# Patient Record
Sex: FEMALE | Race: WHITE | NOT HISPANIC OR LATINO | Employment: OTHER | URBAN - METROPOLITAN AREA
[De-identification: names, ages, dates, MRNs, and addresses within clinical notes are randomized per-mention and may not be internally consistent; named-entity substitution may affect disease eponyms.]

---

## 2018-08-15 ENCOUNTER — OFFICE VISIT (OUTPATIENT)
Dept: INTERNAL MEDICINE CLINIC | Facility: CLINIC | Age: 58
End: 2018-08-15

## 2018-08-15 VITALS
WEIGHT: 141.2 LBS | HEIGHT: 68 IN | SYSTOLIC BLOOD PRESSURE: 100 MMHG | DIASTOLIC BLOOD PRESSURE: 64 MMHG | BODY MASS INDEX: 21.4 KG/M2 | HEART RATE: 59 BPM | OXYGEN SATURATION: 97 %

## 2018-08-15 DIAGNOSIS — L65.9 HAIR LOSS: ICD-10-CM

## 2018-08-15 DIAGNOSIS — Z00.00 ENCOUNTER FOR WELLNESS EXAMINATION: Primary | ICD-10-CM

## 2018-08-15 DIAGNOSIS — Z13.220 ENCOUNTER FOR SCREENING FOR LIPID DISORDER: ICD-10-CM

## 2018-08-15 DIAGNOSIS — Z12.39 SCREENING FOR BREAST CANCER: ICD-10-CM

## 2018-08-15 PROCEDURE — 99386 PREV VISIT NEW AGE 40-64: CPT | Performed by: NURSE PRACTITIONER

## 2018-08-15 RX ORDER — DIPHENOXYLATE HYDROCHLORIDE AND ATROPINE SULFATE 2.5; .025 MG/1; MG/1
1 TABLET ORAL DAILY
COMMUNITY

## 2018-08-15 NOTE — PROGRESS NOTES
Assessment/Plan:     Diagnoses and all orders for this visit:    Encounter for wellness examination    Hair loss  -     Comprehensive metabolic panel; Future  -     CBC and differential; Future  -     Vitamin D 25 hydroxy; Future  -     TSH, 3rd generation; Future  -     T4, free; Future    Encounter for screening for lipid disorder  -     Lipid Panel with Direct LDL reflex; Future    Screening for breast cancer  -     Mammo screening bilateral w cad; Future    Other orders  -     multivitamin (THERAGRAN) TABS; Take 1 tablet by mouth daily  -     VITAMIN A PO; Take by mouth  -     Cholecalciferol (VITAMIN D PO); Take by mouth          Subjective:      Patient ID: Bibi Ayala is a 63 y o  female  Here to establish care  Doing well, eats a vegan diet, exercises regularly     Complaining of brittle hair  over the last couple years   LMP was 2 years ago  Thyroid issues run in her family     Colonoscopy- appt coming up in the next few months   mammo- 2 years ago, due now   Pap 2 years ago, due in 3 years per gyn            The following portions of the patient's history were reviewed and updated as appropriate: allergies, current medications, past family history, past medical history, past social history, past surgical history and problem list     Review of Systems   Constitutional: Negative  HENT: Negative  Eyes: Negative  Respiratory: Negative  Cardiovascular: Negative  Gastrointestinal: Negative  Endocrine: Negative  Genitourinary: Negative  Musculoskeletal: Negative  Skin: Negative  Neurological: Negative  Psychiatric/Behavioral: Negative  Objective:      /64   Pulse 59   Ht 5' 8" (1 727 m)   Wt 64 kg (141 lb 3 2 oz)   SpO2 97%   BMI 21 47 kg/m²          Physical Exam   Constitutional: She is oriented to person, place, and time  She appears well-developed and well-nourished     HENT:   Right Ear: External ear normal    Left Ear: External ear normal    Eyes: Pupils are equal, round, and reactive to light  Neck: No thyromegaly present  Cardiovascular: Normal rate, regular rhythm and normal heart sounds  Pulmonary/Chest: Effort normal and breath sounds normal    Abdominal: Soft  Bowel sounds are normal    Musculoskeletal: She exhibits no edema  Lymphadenopathy:     She has no cervical adenopathy  Neurological: She is alert and oriented to person, place, and time  Psychiatric: She has a normal mood and affect  Her behavior is normal    Vitals reviewed

## 2019-10-02 ENCOUNTER — TELEPHONE (OUTPATIENT)
Dept: INTERNAL MEDICINE CLINIC | Facility: CLINIC | Age: 59
End: 2019-10-02

## 2019-10-28 ENCOUNTER — OFFICE VISIT (OUTPATIENT)
Dept: INTERNAL MEDICINE CLINIC | Facility: CLINIC | Age: 59
End: 2019-10-28

## 2019-10-28 VITALS
RESPIRATION RATE: 18 BRPM | HEIGHT: 68 IN | BODY MASS INDEX: 20.07 KG/M2 | WEIGHT: 132.4 LBS | OXYGEN SATURATION: 97 % | DIASTOLIC BLOOD PRESSURE: 64 MMHG | TEMPERATURE: 97.8 F | HEART RATE: 65 BPM | SYSTOLIC BLOOD PRESSURE: 112 MMHG

## 2019-10-28 DIAGNOSIS — Z00.00 ANNUAL PHYSICAL EXAM: Primary | ICD-10-CM

## 2019-10-28 DIAGNOSIS — Z11.59 ENCOUNTER FOR HEPATITIS C SCREENING TEST FOR LOW RISK PATIENT: ICD-10-CM

## 2019-10-28 PROCEDURE — 99396 PREV VISIT EST AGE 40-64: CPT | Performed by: NURSE PRACTITIONER

## 2019-10-28 RX ORDER — GLUCOSAMINE/D3/BOSWELLIA SERRA 1500MG-400
TABLET ORAL
COMMUNITY
End: 2020-11-02

## 2019-10-28 NOTE — PROGRESS NOTES
ADULT ANNUAL PHYSICAL  [id number] INTERNAL MEDICINE    NAME: Army Salter  AGE: 63 y o  SEX: female  : 1960     DATE: 10/28/2019     Assessment and Plan:     Problem List Items Addressed This Visit     None      Visit Diagnoses     Annual physical exam    -  Primary    Relevant Orders    Comprehensive metabolic panel    Lipid Panel with Direct LDL reflex    Encounter for hepatitis C screening test for low risk patient        Relevant Orders    Hepatitis C antibody          Immunizations and preventive care screenings were discussed with patient today  Appropriate education was printed on patient's after visit summary  Counseling:  Alcohol/drug use: discussed moderation in alcohol intake, the recommendations for healthy alcohol use, and avoidance of illicit drug use  Dental Health: discussed importance of regular tooth brushing, flossing, and dental visits  Injury prevention: discussed safety/seat belts, safety helmets, smoke detectors, carbon dioxide detectors, and smoking near bedding or upholstery  Sexual health: discussed sexually transmitted diseases, partner selection, use of condoms, avoidance of unintended pregnancy, and contraceptive alternatives  · Exercise: the importance of regular exercise/physical activity was discussed  Recommend exercise 3-5 times per week for at least 30 minutes  Return in about 1 year (around 10/28/2020) for Annual physical      Chief Complaint:     Chief Complaint   Patient presents with    Annual Exam    Blood work request     STD check      History of Present Illness:     Adult Annual Physical   Patient here for a comprehensive physical exam  The patient reports no problems  Diet and Physical Activity  · Diet/Nutrition: well balanced diet and plant based, avoids gluten and dairy   · Exercise: 5-7 times a week on average        Depression Screening  PHQ-9 Depression Screening    PHQ-9:    Frequency of the following problems over the past two weeks:       Little interest or pleasure in doing things:  0 - not at all  Feeling down, depressed, or hopeless:  0 - not at all  PHQ-2 Score:  0       General Health  · Sleep: sleeps well  · Hearing: normal - none   · Vision: goes for regular eye exams  · Dental: regular dental visits  /GYN Health  · Patient is: postmenopausal  · Last menstrual period: 4 years ago     Review of Systems:     Review of Systems   Constitutional: Negative for activity change, appetite change, fatigue and unexpected weight change  Eyes: Negative for visual disturbance  Respiratory: Negative for cough, chest tightness, shortness of breath and wheezing  Cardiovascular: Negative for chest pain, palpitations and leg swelling  Gastrointestinal: Negative for abdominal pain, blood in stool, constipation and diarrhea  Genitourinary: Negative for difficulty urinating  Musculoskeletal: Negative for arthralgias  Skin: Negative for rash  Neurological: Negative for dizziness, weakness, light-headedness and headaches  Psychiatric/Behavioral: Negative for sleep disturbance  The patient is not nervous/anxious         Past Medical History:     Past Medical History:   Diagnosis Date    [facility]     No known health problems       Past Surgical History:     Past Surgical History:   Procedure Laterality Date    CERVIX SURGERY      NO PAST SURGERIES        Social History:     Social History     Socioeconomic History    Marital status: /Civil Union     Spouse name: None    Number of children: None    Years of education: None    Highest education level: None   Occupational History    None   Social Needs    Financial resource strain: None    Food insecurity:     Worry: None     Inability: None    Transportation needs:     Medical: None     Non-medical: None   Tobacco Use    Smoking status: Never Smoker    Smokeless tobacco: Never Used   Substance and Sexual Activity    Alcohol use: No     Comment: ocassional    Drug use: No    Sexual activity: None   Lifestyle    Physical activity:     Days per week: None     Minutes per session: None    Stress: None   Relationships    Social connections:     Talks on phone: None     Gets together: None     Attends Taoist service: None     Active member of club or organization: None     Attends meetings of clubs or organizations: None     Relationship status: None    Intimate partner violence:     Fear of current or ex partner: None     Emotionally abused: None     Physically abused: None     Forced sexual activity: None   Other Topics Concern    None   Social History Narrative    None      Family History:     Family History   Problem Relation Age of Onset    Alcohol abuse Neg Hx     Substance Abuse Neg Hx     Mental illness Neg Hx     Depression Neg Hx       Current Medications:     Current Outpatient Medications   Medication Sig Dispense Refill    Biotin 03515 MCG TABS Take by mouth      COLLAGEN PO Take by mouth      multivitamin (THERAGRAN) TABS Take 1 tablet by mouth daily      Cholecalciferol (VITAMIN D PO) Take by mouth      VITAMIN A PO Take by mouth       No current facility-administered medications for this visit  Allergies: Allergies   Allergen Reactions    Anesthetic [Benzocaine] Vomiting      Physical Exam:     /64   Pulse 65   Temp 97 8 °F (36 6 °C) (Oral)   Resp 18   Ht 5' 8" (1 727 m)   Wt 60 1 kg (132 lb 6 4 oz)   SpO2 97%   BMI 20 13 kg/m²     Physical Exam   Constitutional: She is oriented to person, place, and time  She appears well-developed and well-nourished  HENT:   Head: Normocephalic and atraumatic  Mouth/Throat: Oropharynx is clear and moist    Eyes: Pupils are equal, round, and reactive to light  Conjunctivae are normal    Neck: No thyromegaly present  Cardiovascular: Normal rate, regular rhythm, normal heart sounds and intact distal pulses  Pulmonary/Chest: Effort normal and breath sounds normal    Abdominal: Soft  Bowel sounds are normal    Musculoskeletal: Normal range of motion  She exhibits no edema  Lymphadenopathy:     She has no cervical adenopathy  Neurological: She is alert and oriented to person, place, and time  Skin: Skin is warm and dry  Psychiatric: She has a normal mood and affect  Her behavior is normal    Vitals reviewed        [address] INTERNAL MEDICINE

## 2019-10-28 NOTE — PATIENT INSTRUCTIONS

## 2019-11-02 LAB — HCV AB SER-ACNC: NEGATIVE

## 2019-11-11 ENCOUNTER — TELEPHONE (OUTPATIENT)
Dept: INTERNAL MEDICINE CLINIC | Facility: CLINIC | Age: 59
End: 2019-11-11

## 2019-11-11 NOTE — TELEPHONE ENCOUNTER
Her bilirubin always runs high, most commonly from a benign very common condition called gilberts syndrome  We monitor it every year in normal blood work  All of her other liver enzymes are normal  This is not a concern  Her vit d is normal  If she takes a multivitamin there should be vit d 3 400-800 units  It is not necessary for her to take one but will not hurt her to take 400-800 units daily

## 2019-11-11 NOTE — TELEPHONE ENCOUNTER
Pt  Said she saw that her bilirubin number was high and she was not advised of this  Also, she wants to know about her Vit  D level-looks a little low  Should she be taking supplement? Her hair is falling out

## 2020-01-06 ENCOUNTER — TELEPHONE (OUTPATIENT)
Dept: DERMATOLOGY | Facility: CLINIC | Age: 60
End: 2020-01-06

## 2020-01-13 ENCOUNTER — TELEPHONE (OUTPATIENT)
Dept: DERMATOLOGY | Facility: CLINIC | Age: 60
End: 2020-01-13

## 2020-02-10 ENCOUNTER — OFFICE VISIT (OUTPATIENT)
Dept: DERMATOLOGY | Facility: CLINIC | Age: 60
End: 2020-02-10

## 2020-02-10 VITALS — BODY MASS INDEX: 20.31 KG/M2 | TEMPERATURE: 97.9 F | WEIGHT: 134 LBS | HEIGHT: 68 IN

## 2020-02-10 DIAGNOSIS — D22.9 MULTIPLE BENIGN MELANOCYTIC NEVI: ICD-10-CM

## 2020-02-10 DIAGNOSIS — L82.1 SEBORRHEIC KERATOSIS: ICD-10-CM

## 2020-02-10 DIAGNOSIS — D22.39 FIBROUS PAPULE OF NOSE: ICD-10-CM

## 2020-02-10 DIAGNOSIS — I78.1 SPIDER TELANGIECTASIS: Primary | ICD-10-CM

## 2020-02-10 DIAGNOSIS — D18.01 CHERRY ANGIOMA: ICD-10-CM

## 2020-02-10 DIAGNOSIS — L90.5 SCAR: ICD-10-CM

## 2020-02-10 PROCEDURE — 17110 DESTRUCTION B9 LES UP TO 14: CPT | Performed by: DERMATOLOGY

## 2020-02-10 PROCEDURE — 99244 OFF/OP CNSLTJ NEW/EST MOD 40: CPT | Performed by: DERMATOLOGY

## 2020-02-10 NOTE — PROGRESS NOTES
[address] [facility] Note     Patient Name: Shanika Maldonado  Encounter Date: 2/10/20    Today's Chief Concerns:  Michelle Edouard Concern #1:  Lesion on face     Past Medical History:  Have you ever had or currently have any of the following medical conditions or treatments? · HIV/AIDS: No  · Hepatitis B: No  · Hepatitis C: No   · Diabetes: No  · Tuberculosis: No  · Biologic Therapy/Chemotherapy: No  · Organ or Bone Marrow Transplantation: No  · Radiation Treatment: No  · Cancer (If Yes, which types)- No      Have you ever had any of the following skin conditions? · Melanoma? (If Yes, please provide more detail)- No  · Basal Cell Carcinoma: No  · Squamous Cell Carcinoma: No  · Sebaceous Cell Carcinoma: No  · Merkel Cell Carcinoma: No  · Angiosarcoma: No  · Blistering Sunburns: No  · Eczema: No  · Psoriasis: No    Social History:    What is your current Smoking Status? 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 Running     Family history:  Do any of your "first degree relatives" (parent, brother, sister, or child) have any of the following conditions? · Melanoma? (If Yes, which relatives?) No  · Eczema: No  · Asthma: No  · Hay Fever/Seasonal Allergies: No  · Psoriasis: No  · Arthritis: No  · Thyroid Problems: No  · Lupus/Connective Tissue Disease: No  · Diabetes: No  · Stroke: No  · Blood Clots: No  · IBD/Crohn's/Ulcerative Colitis: No  · Vitiligo: No  · Scarring/Keloids: No  · Severe Acne: No  · Pancreatic Cancer: No  · Other known Skin Condition? If Yes, what condition and which relatives?   No    Current Medications:    Current Outpatient Medications:     Biotin 98487 MCG TABS, Take by mouth, Disp: , Rfl:     Cholecalciferol (VITAMIN D PO), Take by mouth, Disp: , Rfl:     COLLAGEN PO, Take by mouth, Disp: , Rfl:     multivitamin (THERAGRAN) TABS, Take 1 tablet by mouth daily, Disp: , Rfl:     VITAMIN A PO, Take by mouth, Disp: , Rfl: Specific Alerts:    Have you been seen by a [facility] Dermatologist in the last 3 years? No    Are you pregnant or planning to become pregnant? No    Are you currently or planning to be nursing or breast feeding? No    Allergies   Allergen Reactions    Anesthetic [Benzocaine] Vomiting       May we call your Preferred Phone number to discuss your specific medical information? YES    May we leave a detailed message that includes your specific medical information? YES    Have you traveled outside of the [facility] in the past 3 months? No    Do you currently have a pacemaker or defibrillator? No    Do you have any artificial heart valves, joints, plates, screws, rods, stents, pins, etc? No   - If Yes, were any placed within the last 2 years? Do you require any medications prior to a surgical procedure? No   - If Yes, for which procedure? n/a   - If Yes, what medications to you require? n/a    Are you taking any medications that cause you to bleed more easily ("blood thinners") No    Have you ever experienced a rapid heartbeat with epinephrine? No    Have you ever been treated with "gold" (gold sodium thiomalate) therapy? No    [facility] can help with wrinkles, "laugh lines," facial volume loss, "double chin," "love handles," age spots, and more  Are you interested in learning today about some of the skin enhancement procedures that we offer? (If Yes, please provide more detail) No    Review of Systems:  Have you recently had or currently have any of the following?     · Fever or chills: No  · Night Sweats: No  · Headaches: No  · Weight Gain: No  · Weight Loss: No  · Blurry Vision: No  · Nausea: No  · Vomiting: No  · Diarrhea: No  · Blood in Stool: No  · Abdominal Pain: No  · Itchy Skin: No  · Painful Joints: No  · Swollen Joints: No  · Muscle Pain: No  · Irregular Mole: YES  · Sun Burn: No  · Dry Skin: No  · Skin Color Changes: No  · Scar or Keloid: YES  · Cold Sores/Fever Blisters: No  · Bacterial Infections/MRSA: No  · Anxiety: No  · Depression: No  · Suicidal or Homicidal Thoughts: No      PHYSICAL EXAM:      Was a chaperone (Derm Clinical Assistant) present for the entirety of the Physical Exam? YES    Did the Dermatology Team specifically ask and  the patient on the importance of a Full Skin Exam to be sure that nothing is missed clinically? YES    Did the patient request or accept a Full Skin Exam?  No    Did the patient specifically refuse to have the areas "under-the-bra" examined by the Dermatologist? No    Did the patient specifically refuse to have the areas "under-the-underwear" examined by the Dermatologist? No      CONSTITUTIONAL:   Vitals:    02/10/20 1316   Temp: 97 9 °F (36 6 °C)   TempSrc: Tympanic   Weight: 60 8 kg (134 lb)   Height: 5' 8" (1 727 m)       PSYCH: Normal mood and affect  EYES: Normal conjunctiva  ENT: Normal lips and oral mucosa  CARDIOVASCULAR: No edema  RESPIRATORY: Normal respirations  HEME/LYMPH/IMMUNO:  No regional lymphadenopathy except as noted below in ASSESSMENT AND PLAN BY DIAGNOSIS     FULL ORGAN SYSTEM SKIN EXAM (SKIN)  Hair, Scalp, Ears, Face Normal except as noted below in Assessment   Neck, Cervical Chain Nodes Normal except as noted below in Assessment   Right Arm/Hand/Fingers Normal except as noted below in Assessment   Left Arm/Hand/Fingers Normal except as noted below in Assessment   Chest/Breasts/Axillae Viewed areas Normal except as noted below in Assessment   Abdomen, Umbilicus Normal except as noted below in Assessment   Back/Spine Normal except as noted below in Assessment   Groin and Genitalia deferred/Buttocks Viewed areas Normal except as noted below in Assessment   Right Leg, Foot, Toes Normal except as noted below in Assessment   Left Leg, Foot, Toes Normal except as noted below in Assessment        ASSESSMENT AND PLAN BY DIAGNOSIS:    History of Present Condition:     Duration:  How long has this been an issue for you? o  Years    Location Affected:  Where on the body is this affecting you?    o  Forehead    Quality:  Is there any bleeding, pain, itch, burning/irritation, or redness associated with the skin lesion?    o  Denies    Severity:  Describe any bleeding, pain, itch, burning/irritation, or redness on a scale of 1 to 10 (with 10 being the worst)  o  Denies    Timing:  Does this condition seem to be there pretty constantly or do you notice it more at specific times throughout the day?    o  Constant   Context:  Have you ever noticed that this condition seems to be associated with specific activities you do?    o  Denies   Modifying Factors:    o Anything that seems to make the condition worse?    -  Denies   o What have you tried to do to make the condition better?    -  Facial enzyme scrub with vitamin c   Associated Signs and Symptoms:  Does this skin lesion seem to be associated with any of the following:  o  Denies     1  SPIDER TELANGIECTASIS ("SPIDER ANGIOMA")    Physical Exam:   Anatomic Location Affected:  Mid forehead    Morphological Description:  Blanchable red macule        Additional History of Present Condition:  Located on the forehead  Patient states been present for years  Assessment and Plan:  Based on a thorough discussion of this condition and the management approach to it (including a comprehensive discussion of the known risks, side effects and potential benefits of treatment), the patient (family) agrees to implement the following specific plan:   Discussed laser therapy, hyfrecation   She will think about it        2  SEBORRHEIC KERATOSIS    Physical Exam:   Anatomic Location Affected:  Right upper back   Morphological Description:  raised, smooth to warty textured, brown, discrete, "stuck-on" appearing plaque    Additional History of Present Condition:  Patient reports new bump on the skin  Irritating  Causes soreness and pain      Assessment and Plan:  Based on a thorough discussion of this condition and the management approach to it (including a comprehensive discussion of the known risks, side effects and potential benefits of treatment), the patient (family) agrees to implement the following specific plan:   Reassure benign   Monitor for change    LN2 today    Seborrheic Keratosis  A seborrheic keratosis is a harmless warty spot that appears during adult life as a common sign of skin aging  Seborrheic keratoses can arise on any area of skin, covered or uncovered, with the usual exception of the palms and soles  They do not arise from mucous membranes  Seborrheic keratoses can have highly variable appearance  Seborrheic keratoses are extremely common  It has been estimated that over 90% of adults over the age of 61 years have one or more of them  They occur in males and females of all races, typically beginning to erupt in the 35s or 45s  They are uncommon under the age of 21 years  The precise cause of seborrhoeic keratoses is not known  Seborrhoeic keratoses are considered degenerative in nature  As time goes by, seborrheic keratoses tend to become more numerous  Some people inherit a tendency to develop a very large number of them; some people may have hundreds of them  The name "seborrheic keratosis" is misleading, because these lesions are not limited to a seborrhoeic distribution (scalp, mid-face, chest, upper back), nor are they formed from sebaceous glands, nor are they associated with sebum -- which is greasy    Seborrheic keratosis may also be called "SK," "Seb K," "basal cell papilloma," "senile wart," or "barnacle "      Researchers have noted:   Eruptive seborrhoeic keratoses can follow sunburn or dermatitis   Skin friction may be the reason they appear in body folds   Viral cause (e g , human papillomavirus) seems unlikely   Stable and clonal mutations or activation of FRFR3, PIK3CA, ESTELLE, AKT1 and EGFR genes are found in seborrhoeic keratoses   Seborrhoeic keratosis can arise from solar lentigo   FRFR3 mutations also arise in solar lentigines  These mutations are associated with increased age and location on the head and neck, suggesting a role of ultraviolet radiation in these lesions   Seborrheic keratoses do not harbour tumour suppressor gene mutations   Epidermal growth factor receptor inhibitors, which are used to treat some cancers, often result in an increase in verrucal (warty) keratoses  There is no easy way to remove multiple lesions on a single occasion  Unless a specific lesion is "inflamed" and is causing pain or stinging/burning or is bleeding, most insurance companies do not authorize treatment  PROCEDURE:  DESTRUCTION OF BENIGN LESIONS  After a thorough discussion of treatment options and risk/benefits/alternatives (including but not limited to local pain, scarring, dyspigmentation, blistering, and possible superinfection), verbal and written consent were obtained and the aforementioned lesions were treated on with cryotherapy using liquid nitrogen x 2 cycle for 5-10 seconds   TOTAL NUMBER of 1 lesions were treated today on the ANATOMIC LOCATION: Right upper back   The patient tolerated the procedure well, and after-care instructions were provided  3  CHERRY ANGIOMAS    Physical Exam:   Anatomic Location Affected:  Trunk and extremities    Morphological Description:  Scattered cherry red, 1-4 mm papules  Assessment and Plan:  Based on a thorough discussion of this condition and the management approach to it (including a comprehensive discussion of the known risks, side effects and potential benefits of treatment), the patient (family) agrees to implement the following specific plan:   Reassure benign         4   MELANOCYTIC NEVI ("Moles")    Physical Exam:   Anatomic Location Affected:   Mostly on sun-exposed areas of the Trunk and extremities    Morphological Description:  Scattered, 1-4mm round to ovoid, symmetrical-appearing, even bordered, skin colored to dark brown macules/papules, mostly in sun-exposed areas     Assessment and Plan:  Based on a thorough discussion of this condition and the management approach to it (including a comprehensive discussion of the known risks, side effects and potential benefits of treatment), the patient (family) agrees to implement the following specific plan:   When outside we recommend using a wide brim hat, sunglasses, long sleeve and pants, sunscreen with SPF 52+ with reapplication every 2 hours, or SPF specific clothing    Monitor for changes  Benign, reassured        5  scar  Physical Exam:   Anatomic Location Affected:  Right buttock   Morphological Description:  Soft, compressible skin colored plaque    Additional History of Present Condition:  History of cyst excision on buttock  Now has loose skin    Assessment and Plan:  Based on a thorough discussion of this condition and the management approach to it (including a comprehensive discussion of the known risks, side effects and potential benefits of treatment), the patient (family) agrees to implement the following specific plan:   Scar from cyst excision   Discussed treatment which may or may not work, including intralesional kenalog or re-excision   However, with location, this scar will possibly form again   Decided to leave alone  6  FIBROUS PAPULE ("ANGIOFIBROMA")    Physical Exam:   Anatomic Location Affected:  Left nasal ala   Morphological Description:  Skin colored papule    Additional History of Present Condition:  Had it burned in the past  Recently shaved it flat with a razor      Assessment and Plan:  Based on a thorough discussion of this condition and the management approach to it (including a comprehensive discussion of the known risks, side effects and potential benefits of treatment), the patient (family) agrees to implement the following specific plan:   Discussed treatment options, including hyfrecation   She will think about it  Monitor for changes  Benign, reassured           Scribe Attestation    I,:   Ej Waldron MA am acting as a scribe while in the presence of the attending physician :        I,:   Nato Hardy MD personally performed the services described in this documentation    as scribed in my presence :

## 2020-02-10 NOTE — PROGRESS NOTES
[address] [facility] Note     Patient Name: Nichole Butcher  Encounter Date: 2/10/2020    Today's Chief Concerns:  Ángela Zhou Concern #1:  Lesion on face    Past Medical History:  Have you ever had or currently have any of the following medical conditions or treatments? · HIV/AIDS: No  · Hepatitis B: No  · Hepatitis C: No   · Diabetes: No  · Tuberculosis: No  · Biologic Therapy/Chemotherapy: No  · Organ or Bone Marrow Transplantation: No  · Radiation Treatment: No  · Cancer (If Yes, which types)- No      Have you ever had any of the following skin conditions? · Melanoma? (If Yes, please provide more detail)- No  · Basal Cell Carcinoma: No  · Squamous Cell Carcinoma: No  · Sebaceous Cell Carcinoma: No  · Merkel Cell Carcinoma: No  · Angiosarcoma: No  · Blistering Sunburns: No  · Eczema: No  · Psoriasis: No    Social History:    What is your current Smoking Status? 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 Running    Family history:  Do any of your "first degree relatives" (parent, brother, sister, or child) have any of the following conditions? · Melanoma? (If Yes, which relatives?) No  · Eczema: No  · Asthma: No  · Hay Fever/Seasonal Allergies: No  · Psoriasis: No  · Arthritis: No  · Thyroid Problems: No  · Lupus/Connective Tissue Disease: No  · Diabetes: No  · Stroke: No  · Blood Clots: No  · IBD/Crohn's/Ulcerative Colitis: No  · Vitiligo: No  · Scarring/Keloids: No  · Severe Acne: No  · Pancreatic Cancer: No  · Other known Skin Condition? If Yes, what condition and which relatives?   No    Current Medications:    Current Outpatient Medications:     Biotin 22271 MCG TABS, Take by mouth, Disp: , Rfl:     Cholecalciferol (VITAMIN D PO), Take by mouth, Disp: , Rfl:     COLLAGEN PO, Take by mouth, Disp: , Rfl:     multivitamin (THERAGRAN) TABS, Take 1 tablet by mouth daily, Disp: , Rfl:     VITAMIN A PO, Take by mouth, Disp: , Rfl:     Specific Alerts:    Have you been seen by a [facility] Dermatologist in the last 3 years? No    Are you pregnant or planning to become pregnant? No    Are you currently or planning to be nursing or breast feeding? No    Allergies   Allergen Reactions    Anesthetic [Benzocaine] Vomiting       May we call your Preferred Phone number to discuss your specific medical information? YES    May we leave a detailed message that includes your specific medical information? YES    Have you traveled outside of the [facility] in the past 3 months? No    Do you currently have a pacemaker or defibrillator? No    Do you have any artificial heart valves, joints, plates, screws, rods, stents, pins, etc? No   - If Yes, were any placed within the last 2 years? Do you require any medications prior to a surgical procedure? No   - If Yes, for which procedure? - If Yes, what medications to you require? Are you taking any medications that cause you to bleed more easily ("blood thinners") No    Have you ever experienced a rapid heartbeat with epinephrine? No    Have you ever been treated with "gold" (gold sodium thiomalate) therapy? No    [facility] can help with wrinkles, "laugh lines," facial volume loss, "double chin," "love handles," age spots, and more  Are you interested in learning today about some of the skin enhancement procedures that we offer? (If Yes, please provide more detail) No    Review of Systems:  Have you recently had or currently have any of the following?     · Fever or chills: No  · Night Sweats: No  · Headaches: No  · Weight Gain: No  · Weight Loss: No  · Blurry Vision: No  · Nausea: No  · Vomiting: No  · Diarrhea: No  · Blood in Stool: No  · Abdominal Pain: No  · Itchy Skin: No  · Painful Joints: No  · Swollen Joints: No  · Muscle Pain: No  · Irregular Mole: YES  · Sun Burn: No  · Dry Skin: No  · Skin Color Changes: No  · Scar or Keloid: YES  · Cold Sores/Fever Blisters: No  · Bacterial Infections/MRSA: No  · Anxiety: No  · Depression: No  · Suicidal or Homicidal Thoughts: No      PHYSICAL EXAM:      Was a chaperone (Derm Clinical Assistant) present for the entirety of the Physical Exam? YES    Did the Dermatology Team specifically ask and  the patient on the importance of a Full Skin Exam to be sure that nothing is missed clinically?  YES    Did the patient request or accept a Full Skin Exam?  No    Did the patient specifically refuse to have the areas "under-the-bra" examined by the Dermatologist? No    Did the patient specifically refuse to have the areas "under-the-underwear" examined by the Dermatologist? No      CONSTITUTIONAL:   Vitals:    02/10/20 1316   Temp: 97 9 °F (36 6 °C)   TempSrc: Tympanic   Weight: 60 8 kg (134 lb)   Height: 5' 8" (1 727 m)       PSYCH: Normal mood and affect  EYES: Normal conjunctiva  ENT: Normal lips and oral mucosa  CARDIOVASCULAR: No edema  RESPIRATORY: Normal respirations  HEME/LYMPH/IMMUNO:  No regional lymphadenopathy except as noted below in ASSESSMENT AND PLAN BY DIAGNOSIS    FULL ORGAN SYSTEM SKIN EXAM (SKIN)  Hair, Scalp, Ears, Face Normal except as noted below in Assessment   Neck, Cervical Chain Nodes Normal except as noted below in Assessment   Right Arm/Hand/Fingers Normal except as noted below in Assessment   Left Arm/Hand/Fingers Normal except as noted below in Assessment   Chest/Breasts/Axillae Viewed areas Normal except as noted below in Assessment   Abdomen, Umbilicus Normal except as noted below in Assessment   Back/Spine Normal except as noted below in Assessment   Groin/Genitalia/Buttocks Viewed areas Normal except as noted below in Assessment   Right Leg, Foot, Toes Normal except as noted below in Assessment   Left Leg, Foot, Toes Normal except as noted below in Assessment        ASSESSMENT AND PLAN BY DIAGNOSIS:    History of Present Condition:     Duration:  How long has this been an issue for you?    o  2 or more years   Location Affected:  Where on the body is this affecting you?    o  Fore head   Quality:  Is there any bleeding, pain, itch, burning/irritation, or redness associated with the skin lesion?    o  Denies   Severity:  Describe any bleeding, pain, itch, burning/irritation, or redness on a scale of 1 to 10 (with 10 being the worst)    o  0   Timing:  Does this condition seem to be there pretty constantly or do you notice it more at specific times throughout the day?    o  Constant   Context:  Have you ever noticed that this condition seems to be associated with specific activities you do?    o  Denies   Modifying Factors:    o Anything that seems to make the condition worse?    -  Denies  o What have you tried to do to make the condition better?    -  Facial enzyme scrub with vitamin C   Associated Signs and Symptoms:  Does this skin lesion seem to be associated with any of the following:  o  Denies               TYPE HERE

## 2020-06-23 DIAGNOSIS — R19.03 ABDOMINAL MASS, RLQ (RIGHT LOWER QUADRANT): Primary | ICD-10-CM

## 2020-08-10 ENCOUNTER — ANNUAL EXAM (OUTPATIENT)
Dept: OBGYN CLINIC | Facility: CLINIC | Age: 60
End: 2020-08-10

## 2020-08-10 VITALS
HEIGHT: 68 IN | BODY MASS INDEX: 20.4 KG/M2 | DIASTOLIC BLOOD PRESSURE: 76 MMHG | SYSTOLIC BLOOD PRESSURE: 120 MMHG | WEIGHT: 134.6 LBS

## 2020-08-10 DIAGNOSIS — Z01.419 ENCNTR FOR GYN EXAM (GENERAL) (ROUTINE) W/O ABN FINDINGS: Primary | ICD-10-CM

## 2020-08-10 DIAGNOSIS — Z11.51 SCREENING FOR HUMAN PAPILLOMAVIRUS: ICD-10-CM

## 2020-08-10 DIAGNOSIS — Z12.39 SCREENING FOR BREAST CANCER: ICD-10-CM

## 2020-08-10 DIAGNOSIS — Z92.89: Primary | ICD-10-CM

## 2020-08-10 PROCEDURE — 3008F BODY MASS INDEX DOCD: CPT | Performed by: OBSTETRICS & GYNECOLOGY

## 2020-08-10 PROCEDURE — 87624 HPV HI-RISK TYP POOLED RSLT: CPT | Performed by: OBSTETRICS & GYNECOLOGY

## 2020-08-10 PROCEDURE — 99386 PREV VISIT NEW AGE 40-64: CPT | Performed by: OBSTETRICS & GYNECOLOGY

## 2020-08-10 PROCEDURE — G0145 SCR C/V CYTO,THINLAYER,RESCR: HCPCS | Performed by: OBSTETRICS & GYNECOLOGY

## 2020-08-10 NOTE — PROGRESS NOTES
Assessment/Plan:  52-year-old female presents for annual visit  Normal gyn exam  Pap and HPV testing performed today  Patient is  for 32 years in a long time in a long time monogamous relationship  She has no domestic issues and feels safe at home  Recommend screening mammogram, order generated  Patient reports colonoscopy is up-to-date  Diet and exercise discussed with patient  Patient states she has a full gym at home and she works out daily  Follow-up in 1 year or as needed     Diagnoses and all orders for this visit:    Encntr for gyn exam (general) (routine) w/o abn findings    Screening for breast cancer  -     Mammo screening bilateral w 3d & cad; Future          Subjective:      Patient ID: Marian Conway is a 62 y o  female  Patient is a 52-year-old  3 para 2 female who is postmenopausal, she presents for annual visit without gyn complaints      The following portions of the patient's history were reviewed and updated as appropriate: allergies, current medications, past family history, past medical history, past social history, past surgical history and problem list     Review of Systems   Constitutional: Negative for appetite change, fatigue, fever and unexpected weight change  Respiratory: Negative for cough and wheezing  Cardiovascular: Negative for leg swelling  Gastrointestinal: Negative for abdominal distention, abdominal pain, anal bleeding, blood in stool, constipation and diarrhea  Genitourinary: Negative for difficulty urinating, dyspareunia, dysuria, frequency, genital sores, menstrual problem, pelvic pain, urgency, vaginal bleeding, vaginal discharge and vaginal pain  Musculoskeletal: Negative for arthralgias and myalgias  Neurological: Negative for headaches  Psychiatric/Behavioral: Negative for sleep disturbance  The patient is not nervous/anxious            Objective:      /76 (BP Location: Left arm, Patient Position: Sitting, Cuff Size: Standard)   Ht 5' 8" (1 727 m)   Wt 61 1 kg (134 lb 9 6 oz)   BMI 20 47 kg/m²          Physical Exam  Vitals signs and nursing note reviewed  Exam conducted with a chaperone present  Constitutional:       Appearance: Normal appearance  She is well-developed and normal weight  HENT:      Head: Normocephalic and atraumatic  Neck:      Musculoskeletal: Neck supple  Thyroid: No thyromegaly  Cardiovascular:      Rate and Rhythm: Normal rate and regular rhythm  Heart sounds: Normal heart sounds  No murmur  Pulmonary:      Effort: Pulmonary effort is normal       Breath sounds: Normal breath sounds  Chest:      Breasts: Breasts are symmetrical          Right: No inverted nipple, mass, nipple discharge, skin change or tenderness  Left: No inverted nipple, mass, nipple discharge, skin change or tenderness  Abdominal:      General: Bowel sounds are normal  There is no distension  Palpations: Abdomen is soft  Tenderness: There is no abdominal tenderness  Hernia: There is no hernia in the left inguinal area  Genitourinary:     General: Normal vulva  Labia:         Right: No rash or tenderness  Left: No rash or tenderness  Vagina: Normal  No vaginal discharge  Cervix: No cervical motion tenderness, discharge or friability  Adnexa:         Right: No mass, tenderness or fullness  Left: No mass  Lymphadenopathy:      Lower Body: No right inguinal adenopathy  No left inguinal adenopathy  Skin:     General: Skin is warm and dry  Neurological:      Mental Status: She is alert and oriented to person, place, and time  Psychiatric:         Mood and Affect: Mood normal          Behavior: Behavior normal          Thought Content:  Thought content normal          Judgment: Judgment normal

## 2020-08-11 ENCOUNTER — TELEPHONE (OUTPATIENT)
Dept: OBGYN CLINIC | Facility: CLINIC | Age: 60
End: 2020-08-11

## 2020-08-11 NOTE — TELEPHONE ENCOUNTER
Patient called,  For her results from her Ultrasound she had done at [facility] MRI/radiology, called and they will fax the results,please review the results on your desk, and the patient would like a call back because she has questions  She would like to speak personally with you about

## 2020-08-12 ENCOUNTER — TELEPHONE (OUTPATIENT)
Dept: OBGYN CLINIC | Facility: CLINIC | Age: 60
End: 2020-08-12

## 2020-08-12 NOTE — TELEPHONE ENCOUNTER
patient called , someone had left a vm on her phone, spoke with Dr Emi Dennis , he left a detailed message on her vm about her results, she states she will listen to it and a virtual betsey-t made for tomorrow at 8:45 am  To discuss as per patient request

## 2020-08-12 NOTE — TELEPHONE ENCOUNTER
patient called and left a vm to rtc , about her test results, Dr Maulik Allred has not reviewed them yet ,

## 2020-08-12 NOTE — TELEPHONE ENCOUNTER
Spoke with patient she will listen to Dr Jesus Patterson message and appt for a virtual  appt made for tomorrow 8/13/2020 to discuss questions she may have

## 2020-08-12 NOTE — TELEPHONE ENCOUNTER
I reviewed her ultrasound her fibroids you to be stable  She has a stable right adnexal mass which is probable exophytic fibroid  I left a message on her cell phone with these results

## 2020-08-12 NOTE — TELEPHONE ENCOUNTER
Ask Dr Erika Lambert to review and please call patient  Tell him that patient is waiting for a response  Thank you

## 2020-08-12 NOTE — TELEPHONE ENCOUNTER
Spoke with Dr Jesus Gan, He did call the patient and left a lengthy voice message, patient did call back and we set up a virtual visit for 8/13/2020 so she can discuss further questions

## 2020-08-13 ENCOUNTER — TELEPHONE (OUTPATIENT)
Dept: OBGYN CLINIC | Facility: CLINIC | Age: 60
End: 2020-08-13

## 2020-08-13 LAB
LAB AP GYN PRIMARY INTERPRETATION: NORMAL
Lab: NORMAL

## 2020-08-13 NOTE — TELEPHONE ENCOUNTER
Patient called and left a vm that she got disconnected while on hold with you ,she would like the measurements of the fibroid in her uterus as well as she would like you to send her the report

## 2020-08-14 ENCOUNTER — TELEPHONE (OUTPATIENT)
Dept: OBGYN CLINIC | Facility: CLINIC | Age: 60
End: 2020-08-14

## 2020-08-14 NOTE — TELEPHONE ENCOUNTER
Spoke with patient and reviewed ultrasound  No change in her fibroid  She is aware  She off a follow-up for annual visit

## 2020-08-21 LAB
HPV HR 12 DNA CVX QL NAA+PROBE: NEGATIVE
HPV16 DNA CVX QL NAA+PROBE: NEGATIVE
HPV18 DNA CVX QL NAA+PROBE: NEGATIVE

## 2020-10-15 ENCOUNTER — TELEPHONE (OUTPATIENT)
Dept: INTERNAL MEDICINE CLINIC | Facility: CLINIC | Age: 60
End: 2020-10-15

## 2020-11-02 ENCOUNTER — OFFICE VISIT (OUTPATIENT)
Dept: INTERNAL MEDICINE CLINIC | Facility: CLINIC | Age: 60
End: 2020-11-02

## 2020-11-02 VITALS
OXYGEN SATURATION: 98 % | BODY MASS INDEX: 20.46 KG/M2 | HEIGHT: 68 IN | TEMPERATURE: 96.5 F | DIASTOLIC BLOOD PRESSURE: 68 MMHG | WEIGHT: 135 LBS | SYSTOLIC BLOOD PRESSURE: 110 MMHG | HEART RATE: 71 BPM

## 2020-11-02 DIAGNOSIS — Z00.00 ANNUAL PHYSICAL EXAM: Primary | ICD-10-CM

## 2020-11-02 DIAGNOSIS — Z78.0 POST-MENOPAUSAL: ICD-10-CM

## 2020-11-02 DIAGNOSIS — Z13.21 ENCOUNTER FOR VITAMIN DEFICIENCY SCREENING: ICD-10-CM

## 2020-11-02 DIAGNOSIS — Z13.0 SCREENING FOR DEFICIENCY ANEMIA: ICD-10-CM

## 2020-11-02 DIAGNOSIS — Z13.29 SCREENING FOR THYROID DISORDER: ICD-10-CM

## 2020-11-02 PROCEDURE — 99396 PREV VISIT EST AGE 40-64: CPT | Performed by: NURSE PRACTITIONER

## 2020-11-06 ENCOUNTER — TELEPHONE (OUTPATIENT)
Dept: INTERNAL MEDICINE CLINIC | Facility: CLINIC | Age: 60
End: 2020-11-06

## 2020-11-09 DIAGNOSIS — R79.89 ABNORMAL CBC: Primary | ICD-10-CM

## 2020-11-11 ENCOUNTER — TELEPHONE (OUTPATIENT)
Dept: INTERNAL MEDICINE CLINIC | Facility: CLINIC | Age: 60
End: 2020-11-11

## 2020-12-10 ENCOUNTER — TELEPHONE (OUTPATIENT)
Dept: INTERNAL MEDICINE CLINIC | Facility: CLINIC | Age: 60
End: 2020-12-10

## 2021-12-13 ENCOUNTER — ANNUAL EXAM (OUTPATIENT)
Dept: OBGYN CLINIC | Facility: CLINIC | Age: 61
End: 2021-12-13

## 2021-12-13 VITALS
DIASTOLIC BLOOD PRESSURE: 72 MMHG | SYSTOLIC BLOOD PRESSURE: 116 MMHG | WEIGHT: 131.8 LBS | BODY MASS INDEX: 19.97 KG/M2 | HEIGHT: 68 IN

## 2021-12-13 DIAGNOSIS — Z01.411 ENCOUNTER FOR WELL WOMAN EXAM WITH ABNORMAL FINDINGS: Primary | ICD-10-CM

## 2021-12-13 DIAGNOSIS — N63.25 BREAST LUMP ON LEFT SIDE AT 6 O'CLOCK POSITION: ICD-10-CM

## 2021-12-13 DIAGNOSIS — Z80.3 FAMILY HISTORY OF BREAST CANCER: ICD-10-CM

## 2021-12-13 PROCEDURE — S0612 ANNUAL GYNECOLOGICAL EXAMINA: HCPCS | Performed by: OBSTETRICS & GYNECOLOGY

## 2021-12-13 RX ORDER — MAGNESIUM 30 MG
30 TABLET ORAL 2 TIMES DAILY
COMMUNITY

## 2021-12-15 ENCOUNTER — TELEPHONE (OUTPATIENT)
Dept: GENETICS | Facility: CLINIC | Age: 61
End: 2021-12-15

## 2021-12-22 ENCOUNTER — HOSPITAL ENCOUNTER (OUTPATIENT)
Dept: ULTRASOUND IMAGING | Facility: CLINIC | Age: 61
Discharge: HOME | End: 2021-12-22

## 2021-12-22 ENCOUNTER — HOSPITAL ENCOUNTER (OUTPATIENT)
Dept: MAMMOGRAPHY | Facility: CLINIC | Age: 61
Discharge: HOME | End: 2021-12-22

## 2021-12-22 VITALS — HEIGHT: 68 IN | BODY MASS INDEX: 19.85 KG/M2 | WEIGHT: 131 LBS

## 2021-12-22 DIAGNOSIS — N63.25 BREAST LUMP ON LEFT SIDE AT 6 O'CLOCK POSITION: ICD-10-CM

## 2021-12-22 PROCEDURE — 76642 ULTRASOUND BREAST LIMITED: CPT

## 2021-12-22 PROCEDURE — 77066 DX MAMMO INCL CAD BI: CPT

## 2021-12-22 PROCEDURE — G0279 TOMOSYNTHESIS, MAMMO: HCPCS

## 2022-01-11 ENCOUNTER — TELEPHONE (OUTPATIENT)
Dept: OBGYN CLINIC | Facility: CLINIC | Age: 62
End: 2022-01-11

## 2022-01-11 NOTE — TELEPHONE ENCOUNTER
----- Message from Carlie Sanchez MD sent at 1/6/2022  4:21 PM EST -----  Dense breast noted, results discussed with patient at the time of the imaging patient desire to repeat ultrasound in 6 months then doing breast biopsy I also recommend patient to be seen by breast specialist for 2nd opinion for now recommendation to repeat ultrasound in 6 months of the left breast and routine screening mammogram in 1 y for both breast

## 2022-06-20 ENCOUNTER — HOSPITAL ENCOUNTER (OUTPATIENT)
Dept: ULTRASOUND IMAGING | Facility: CLINIC | Age: 62
Discharge: HOME | End: 2022-06-20

## 2022-06-20 VITALS — BODY MASS INDEX: 19.85 KG/M2 | HEIGHT: 68 IN | WEIGHT: 130.95 LBS

## 2022-06-20 DIAGNOSIS — Z09 FOLLOW-UP EXAM, 3-6 MONTHS SINCE PREVIOUS EXAM: ICD-10-CM

## 2022-06-20 PROCEDURE — 76642 ULTRASOUND BREAST LIMITED: CPT

## 2022-07-13 ENCOUNTER — HOSPITAL ENCOUNTER (EMERGENCY)
Facility: HOSPITAL | Age: 62
Discharge: HOME | End: 2022-07-13
Attending: EMERGENCY MEDICINE | Admitting: EMERGENCY MEDICINE
Payer: COMMERCIAL

## 2022-07-13 ENCOUNTER — APPOINTMENT (EMERGENCY)
Dept: RADIOLOGY | Facility: HOSPITAL | Age: 62
End: 2022-07-13
Payer: COMMERCIAL

## 2022-07-13 VITALS
WEIGHT: 130.95 LBS | BODY MASS INDEX: 19.91 KG/M2 | DIASTOLIC BLOOD PRESSURE: 56 MMHG | RESPIRATION RATE: 16 BRPM | HEART RATE: 58 BPM | SYSTOLIC BLOOD PRESSURE: 115 MMHG | TEMPERATURE: 98 F | OXYGEN SATURATION: 99 %

## 2022-07-13 DIAGNOSIS — S49.91XA ACROMIOCLAVICULAR JOINT INJURY, RIGHT, INITIAL ENCOUNTER: Primary | ICD-10-CM

## 2022-07-13 PROCEDURE — 99283 EMERGENCY DEPT VISIT LOW MDM: CPT

## 2022-07-13 PROCEDURE — 99282 EMERGENCY DEPT VISIT SF MDM: CPT | Performed by: EMERGENCY MEDICINE

## 2022-07-13 PROCEDURE — 73030 X-RAY EXAM OF SHOULDER: CPT

## 2022-07-13 NOTE — DISCHARGE INSTRUCTIONS
Call and schedule a follow-up appointment with the orthopedic doctor  I would refrain from heavy lifting until you follow-up with their team   Return to the emergency department should you have worsening pain, decreased range of motion, fevers or chills, or any other concerning symptoms

## 2022-07-13 NOTE — ED PROVIDER NOTES
History  Chief Complaint   Patient presents with    Shoulder Injury     Pt states that she has been hearing a pop in her shoulder  Abelino Posadas is a 65year old female presenting for evaluation right shoulder pain/injury  Three days ago, the patient was reaching across her body with her right arm to grab a glass of water when she felt a pop her shoulder  Patient describes having soreness after this event and she noticed that her right [facility] [place] joint appeared more prominent in comparison to the left  Patient denies any decrease in her range of motion or tenderness when ranging her shoulder  She denied any decreased strength or any numbness or tingling  She endorses tenderness to palpation of the [facility] [place] joint  She has been taking Tylenol and ibuprofen for her symptoms with good relief  The patient is concerned about possible AC joint dysfunction and she originally expressed hope that I would be able to put it back in place  History provided by:  Patient   used: No        Prior to Admission Medications   Prescriptions Last Dose Informant Patient Reported? Taking?    COLLAGEN PO   Yes No   Sig: Take by mouth   Cholecalciferol (VITAMIN D PO)   Yes No   Sig: Take by mouth   VITAMIN A PO   Yes No   Sig: Take by mouth   Zinc Sulfate (ZINC 15 PO)   Yes No   Sig: Take by mouth   magnesium 30 MG tablet   Yes No   Sig: Take 30 mg by mouth 2 (two) times a day   multivitamin (THERAGRAN) TABS   Yes No   Sig: Take 1 tablet by mouth daily      Facility-Administered Medications: None       Past Medical History:   Diagnosis Date    [facility]     No known health problems        Past Surgical History:   Procedure Laterality Date    CERVIX SURGERY      CONE SURGERY      SECTION      INDUCED       NO PAST SURGERIES      RHINOPLASTY      TUBAL LIGATION         Family History   Problem Relation Age of Onset    Atrial fibrillation Mother     Heart disease Father     No Known Problems Sister     No Known Problems Sister     No Known Problems Sister     No Known Problems Daughter     No Known Problems Son     No Known Problems Maternal Grandmother     No Known Problems Maternal Grandfather     No Known Problems Paternal Grandmother     No Known Problems Paternal Grandfather     No Known Problems Maternal Aunt     Alcohol abuse Neg Hx     Substance Abuse Neg Hx     Mental illness Neg Hx     Depression Neg Hx      I have reviewed and agree with the history as documented  E-Cigarette/Vaping    E-Cigarette Use Never User      E-Cigarette/Vaping Substances    Nicotine No     Flavoring No      Social History     Tobacco Use    Smoking status: Never Smoker    Smokeless tobacco: Never Used   Vaping Use    Vaping Use: Never used   Substance Use Topics    Alcohol use: Yes     Comment: ocassional    Drug use: No        Review of Systems   Constitutional: Negative for chills and fever  HENT: Negative for ear pain and sore throat  Eyes: Negative for pain and visual disturbance  Respiratory: Negative for cough and shortness of breath  Cardiovascular: Negative for chest pain and palpitations  Gastrointestinal: Negative for abdominal pain and vomiting  Genitourinary: Negative for dysuria and hematuria  Musculoskeletal: Positive for arthralgias  Negative for back pain  Right shoulder pain/injury   Skin: Negative for color change and rash  Neurological: Negative for seizures and syncope  All other systems reviewed and are negative        Physical Exam  ED Triage Vitals   Temperature Pulse Respirations Blood Pressure SpO2   07/13/22 0735 07/13/22 0707 07/13/22 0707 07/13/22 0707 07/13/22 0707   98 °F (36 7 °C) 58 16 115/56 99 %      Temp src Heart Rate Source Patient Position - Orthostatic VS BP Location FiO2 (%)   -- -- 07/13/22 0707 07/13/22 0707 --     Lying Right arm       Pain Score       07/13/22 0707       No Pain             Orthostatic Vital Signs  Vitals:    07/13/22 0707   BP: 115/56   Pulse: 58   Patient Position - Orthostatic VS: Lying       Physical Exam  Vitals and nursing note reviewed  Constitutional:       General: She is not in acute distress  Appearance: Normal appearance  HENT:      Head: Normocephalic and atraumatic  Mouth/Throat:      Mouth: Mucous membranes are moist       Pharynx: Oropharynx is clear  Eyes:      General: No scleral icterus  Conjunctiva/sclera: Conjunctivae normal    Cardiovascular:      Rate and Rhythm: Normal rate and regular rhythm  Heart sounds: Normal heart sounds  Pulmonary:      Effort: Pulmonary effort is normal  No respiratory distress  Breath sounds: Normal breath sounds  Abdominal:      General: Abdomen is flat  There is no distension  Tenderness: There is no abdominal tenderness  Musculoskeletal:         General: No tenderness or signs of injury  Right shoulder: Deformity and bony tenderness present  No swelling or effusion  Normal range of motion  Normal strength  Normal pulse  Left shoulder: Normal       Cervical back: Neck supple  No rigidity  Comments: Patient's right acromion/clavicular head does appear to be slightly more superiorly displaced in comparison to her left  Patient's right AC joint was tender to palpation  Patient did not have any decreased range of motion  She did not have any decreased strength  She was neurovascularly intact  Skin:     General: Skin is warm  Coloration: Skin is not jaundiced  Findings: No erythema or rash  Neurological:      General: No focal deficit present  Mental Status: She is alert  Mental status is at baseline     Psychiatric:         Mood and Affect: Mood normal          Behavior: Behavior normal          ED Medications  Medications - No data to display    Diagnostic Studies  Results Reviewed     None                 XR shoulder 2+ views RIGHT    (Results Pending) Procedures  Procedures      ED Course                                       MDM  Number of Diagnoses or Management Options  Acromioclavicular joint injury, right, initial encounter: new and requires workup     Amount and/or Complexity of Data Reviewed  Tests in the radiology section of CPT®: ordered and reviewed    Risk of Complications, Morbidity, and/or Mortality  General comments: Michelle oMsher is a 65year old female presenting for evaluation right shoulder pain/injury  On exam, the patient had full range of motion of her right shoulder, I did not appreciate any weakness, she was neurovascularly intact  Patient had tenderness to palpation of her right AC joint which did appear more prominent and superiorly displaced in comparison to the left  X-ray of the right shoulder did appear to show some [facility] of the [facility]  I advised the patient call and schedule a follow-up appointment with Orthopedics, I made a referral for her  I advised her to refrain from heavy lifting until she has this follow-up appointment  I gave her return precautions, she was agreeable to the plan  Patient Progress  Patient progress: stable      Disposition  Final diagnoses:   Acromioclavicular joint injury, right, initial encounter     Time reflects when diagnosis was documented in both MDM as applicable and the Disposition within this note     Time User Action Codes Description Comment    7/13/2022  8:05 AM Brendan Leigh Add [[id number]] Acromioclavicular joint injury, right, initial encounter       ED Disposition     ED Disposition   Discharge    Condition   Stable    Date/Time   Wed Jul 13, 2022  7:56 AM    Comment   Drake Dark discharge to home/self care                 Follow-up Information     Follow up With Specialties Details Why Contact Info Additional [address] [facility] [place] [facility] Schedule an appointment as soon as possible for a visit   [address] [place] [address] [address] [id number] [facility], [address] [facility] [address] [place], [place], [address] [phone]          Patient's Medications   Discharge Prescriptions    No medications on file         PDMP Review     None           ED Provider  Attending physically available and evaluated Dharmesh Yung I managed the patient along with the ED Attending      Electronically Signed by         Leonel Lo DO  07/13/22 0813

## 2022-07-24 NOTE — ED ATTENDING ATTESTATION
7/13/2022  IMarina MD, saw and evaluated the patient  I have discussed the patient with the resident/non-physician practitioner and agree with the resident's/non-physician practitioner's findings, Plan of Care, and MDM as documented in the resident's/non-physician practitioner's note, except where noted  All available labs and Radiology studies were reviewed  I was present for key portions of any procedure(s) performed by the resident/non-physician practitioner and I was immediately available to provide assistance  At this point I agree with the current assessment done in the Emergency Department    I have conducted an independent evaluation of this patient a history and physical is as follows:see h and p above -agree with resident tx plan/ dispo    ED Course  ED Course as of 07/24/22 1200   Wed Jul 13, 2022   0805 R shoulder xray --  no fx/dislocation/separation          Critical Care Time  Procedures

## 2023-01-27 ENCOUNTER — ANNUAL EXAM (OUTPATIENT)
Dept: OBGYN CLINIC | Facility: CLINIC | Age: 63
End: 2023-01-27

## 2023-01-27 VITALS
BODY MASS INDEX: 19.7 KG/M2 | SYSTOLIC BLOOD PRESSURE: 116 MMHG | HEIGHT: 68 IN | DIASTOLIC BLOOD PRESSURE: 78 MMHG | WEIGHT: 130 LBS

## 2023-01-27 DIAGNOSIS — Z78.0 MENOPAUSE: ICD-10-CM

## 2023-01-27 DIAGNOSIS — R92.8 ABNORMAL MAMMOGRAM: ICD-10-CM

## 2023-01-27 DIAGNOSIS — Z01.419 ENCOUNTER FOR GYNECOLOGICAL EXAMINATION WITHOUT ABNORMAL FINDING: Primary | ICD-10-CM

## 2023-01-27 NOTE — PROGRESS NOTES
Juan Ramon Rhoades is a 65 y o  female who presents for annual well woman exam    Denies any PMB  Mother breast cancer at age 80    Menstrual History:  OB History        3    Para   2    Term   2            AB        Living   2       SAB        IAB        Ectopic        Multiple        Live Births                      No LMP recorded  Patient is postmenopausal        The following portions of the patient's history were reviewed and updated as appropriate: allergies, current medications, past family history, past medical history, past social history, past surgical history and problem list     Review of Systems  Review of Systems   Constitutional: Negative for activity change, appetite change, chills, fatigue and fever  Respiratory: Negative for apnea, cough, chest tightness and shortness of breath  Cardiovascular: Negative for chest pain, palpitations and leg swelling  Gastrointestinal: Negative for abdominal pain, constipation, diarrhea, nausea and vomiting  Genitourinary: Negative for difficulty urinating, dysuria, flank pain, frequency, hematuria and urgency  Neurological: Negative for dizziness, seizures, syncope, light-headedness, numbness and headaches  Psychiatric/Behavioral: Negative for agitation and confusion            Objective      /78 (BP Location: Left arm, Patient Position: Sitting, Cuff Size: Adult)   Ht 5' 8" (1 727 m)   Wt 59 kg (130 lb)   BMI 19 77 kg/m²     Physical Exam  OBGyn Exam     General:   alert and oriented, in no acute distress, alert, appears stated age and cooperative   Heart: regular rate and rhythm, S1, S2 normal, no murmur, click, rub or gallop   Lungs: clear to auscultation bilaterally   Abdomen: soft, non-tender, without masses or organomegaly   Vulva: normal   Vagina: normal mucosa, normal discharge   Cervix: no cervical motion tenderness and no lesions   Uterus: normal size   Adnexa:  Breast Exam:  normal adnexa  breasts appear normal, no suspicious masses, no skin or nipple changes or axillary nodes, fibrocystic breast density noted on the left breast upper outer quadrant patient has cyst noted on ultrasound at follow-up mammogram and ultrasound scheduled  Assessment      @well woman@   25-year-old female  Annual exam   Post menopause   Breast lump left breast follow-up with ultrasound and mammogram  Family history of breast cancer and ovarian cancer declines genetic testing  Plan   Pap/hpv   Diet/exercise  Calcium/vitamin-D   Diagnostic mammogram and ultrasound  Colonoscopy up-to-date  DEXA scan  Return to office for annual exam     All questions answered  There are no Patient Instructions on file for this visit

## 2023-02-07 LAB
LAB AP GYN PRIMARY INTERPRETATION: NORMAL
Lab: NORMAL
PATH INTERP SPEC-IMP: NORMAL

## 2023-02-13 ENCOUNTER — HOSPITAL ENCOUNTER (OUTPATIENT)
Dept: ULTRASOUND IMAGING | Facility: CLINIC | Age: 63
Discharge: HOME | End: 2023-02-13

## 2023-02-13 ENCOUNTER — HOSPITAL ENCOUNTER (OUTPATIENT)
Dept: MAMMOGRAPHY | Facility: CLINIC | Age: 63
Discharge: HOME | End: 2023-02-13

## 2023-02-13 DIAGNOSIS — R92.8 ABNORMAL MAMMOGRAM: ICD-10-CM

## 2023-05-01 ENCOUNTER — TELEPHONE (OUTPATIENT)
Dept: OTHER | Facility: OTHER | Age: 63
End: 2023-05-01

## 2023-05-01 NOTE — TELEPHONE ENCOUNTER
Patient is requesting an appointment with Dr Fernanda Singer for intermittent rectal bleeding  Patient states the bleeding occurs once a month or once every other month  Patient recently had an episode on Saturday where she noticed blood when showering that took about 5 minutes to stop  Patient denies any bleeding today

## 2023-05-30 DIAGNOSIS — M85.80 OSTEOPENIA, UNSPECIFIED LOCATION: Primary | ICD-10-CM

## 2023-09-19 ENCOUNTER — HOSPITAL ENCOUNTER (OUTPATIENT)
Dept: ULTRASOUND IMAGING | Facility: CLINIC | Age: 63
Discharge: HOME | End: 2023-09-19
Payer: COMMERCIAL

## 2023-09-19 DIAGNOSIS — R92.8 ABNORMAL FINDINGS ON DIAGNOSTIC IMAGING OF BREAST: ICD-10-CM

## 2023-09-19 PROCEDURE — 76642 ULTRASOUND BREAST LIMITED: CPT

## 2023-09-25 DIAGNOSIS — R92.30 DENSE BREAST: Primary | ICD-10-CM

## 2023-09-25 DIAGNOSIS — R92.2 DENSE BREAST: Primary | ICD-10-CM

## 2023-09-25 DIAGNOSIS — Z80.3 FAMILY HISTORY OF BREAST CANCER: ICD-10-CM

## 2023-09-25 DIAGNOSIS — R92.8 ABNORMAL MAMMOGRAM: ICD-10-CM

## 2023-11-08 ENCOUNTER — TELEPHONE (OUTPATIENT)
Age: 63
End: 2023-11-08

## 2023-11-08 NOTE — TELEPHONE ENCOUNTER
Patients GI provider:  Dr. David Tavera    Number to return call: [phone]    Reason for call: Pt calling to schedule colonoscopy. Pt request the [address] [facility] location. Patient also request Clenpiq as her prep. Patient is a self pay.       Scheduled procedure/appointment date if applicable: N/A

## 2023-11-09 NOTE — TELEPHONE ENCOUNTER
Left message for pt to call back to schedule    5 yr colon recall, 9/10/18 BEC WNL, fam hx colon cancer?, BMI 19

## 2024-02-22 ENCOUNTER — TELEPHONE (OUTPATIENT)
Dept: OBGYN CLINIC | Facility: CLINIC | Age: 64
End: 2024-02-22

## 2024-02-22 NOTE — TELEPHONE ENCOUNTER
Patient returned Tenisha's call attempted to warm transfer call to office but call did not go through, patient hung up.

## 2024-02-22 NOTE — TELEPHONE ENCOUNTER
Called, unable to leave message for patient since no communication form is filled out.   Will follow up with patient again.

## 2024-02-22 NOTE — TELEPHONE ENCOUNTER
----- Message from Anna Marie Keane sent at 2/22/2024  9:55 AM EST -----  Regarding: Cyst  Contact: [phone]  Pt called  states Cyst discomfort  and request an appointment to have Cyst  Aspirated and is self pay  and wanted to also know the cost  her call back number is [phone]

## 2024-03-04 ENCOUNTER — TELEPHONE (OUTPATIENT)
Age: 64
End: 2024-03-04

## 2024-03-04 NOTE — TELEPHONE ENCOUNTER
Patient called instead of having mammogram follow ups, she would like to have the cyst on the left breast aspirated.  Is this able to be done in the office or does this have to be done at [facility] radiology.  Her next fu mammogram is March 25, 2024.  She said you have leave a message on  her cell phone.  She is the only person who answers the phone

## 2024-03-05 DIAGNOSIS — N63.20 MASS OF LEFT BREAST, UNSPECIFIED QUADRANT: Primary | ICD-10-CM

## 2024-03-05 NOTE — TELEPHONE ENCOUNTER
Patient has mammogram scheduled for 3/25/24. Patient is requesting a new order be put in for cyst aspiration instead of having a mammogram. Patient states it was offered to her initially but she had declined and agreed to have close monitoring with mammograms. Patient would just like to proceed with breast aspiration. Patient spoke to imaging facility, facility would be able to accommodate patient. Is this okay?

## 2024-03-05 NOTE — TELEPHONE ENCOUNTER
Yes that is okay patient is due for annual exam can you give her appointment and if she would like we can give her the prescription at that time if she would like the prescription prior you can give it to her and please make sure she has annual appointment schedule

## 2024-03-07 DIAGNOSIS — N60.02 BREAST CYST, LEFT: Primary | ICD-10-CM

## 2024-03-07 NOTE — TELEPHONE ENCOUNTER
I spoke to the patient a prescription was inserted for aspiration of breast cyst  Patient questions answered and patient was satisfied

## 2024-03-07 NOTE — TELEPHONE ENCOUNTER
Patient called back is scheduled for an annual exam in October with Dr. Garcia. She is asking for the order to have the aspiration completed now please. She is requesting a call back to let her know when the order is placed so she can call to schedule right away.

## 2024-05-06 ENCOUNTER — PATIENT OUTREACH (OUTPATIENT)
Facility: HOSPITAL | Age: 64
End: 2024-05-06

## 2024-05-06 ENCOUNTER — TELEPHONE (OUTPATIENT)
Dept: MAMMOGRAPHY | Facility: CLINIC | Age: 64
End: 2024-05-06

## 2024-05-06 NOTE — PROGRESS NOTES
"Program details reviewed (Yes/No): YES    Patient lives in [place]: (Yes/No): YES    Uninsured/Underinsured(List): UNINSURED    Patient Agreeable to Participation(Yes/No): YES    Verbal Consent Given (Yes/No): YES    [employer] Consent form signed \"verba consent\"(Yes/No): YES    Patient Entered into [employer] (Yes/No): YES      "

## 2024-05-08 ENCOUNTER — TELEPHONE (OUTPATIENT)
Facility: HOSPITAL | Age: 64
End: 2024-05-08

## 2024-05-08 NOTE — TELEPHONE ENCOUNTER
I called patient back to verify number of household under income as she might be over the federal poverty limit for [place].

## 2024-05-08 NOTE — TELEPHONE ENCOUNTER
I returned patients call and left a message she had called to provide her monthly income. She was encouraged to call my direct line.

## 2024-05-08 NOTE — TELEPHONE ENCOUNTER
Patient returned call, she provided weekly income. I advised I will update on her application. I answered all questions for patient.

## 2024-06-18 ENCOUNTER — HOSPITAL ENCOUNTER (OUTPATIENT)
Dept: ULTRASOUND IMAGING | Facility: CLINIC | Age: 64
Discharge: HOME | End: 2024-06-18
Payer: OTHER GOVERNMENT

## 2024-06-18 ENCOUNTER — HOSPITAL ENCOUNTER (OUTPATIENT)
Dept: MAMMOGRAPHY | Facility: CLINIC | Age: 64
Discharge: HOME | End: 2024-06-18
Payer: OTHER GOVERNMENT

## 2024-06-18 VITALS — HEIGHT: 69 IN | BODY MASS INDEX: 19.11 KG/M2 | WEIGHT: 129 LBS

## 2024-06-18 DIAGNOSIS — Z09 FOLLOW-UP EXAM, 3-6 MONTHS SINCE PREVIOUS EXAM: ICD-10-CM

## 2024-06-18 PROCEDURE — G0279 TOMOSYNTHESIS, MAMMO: HCPCS

## 2024-06-18 PROCEDURE — 76642 ULTRASOUND BREAST LIMITED: CPT

## 2024-06-18 PROCEDURE — 77066 DX MAMMO INCL CAD BI: CPT
